# Patient Record
Sex: MALE | Race: WHITE | NOT HISPANIC OR LATINO | URBAN - METROPOLITAN AREA
[De-identification: names, ages, dates, MRNs, and addresses within clinical notes are randomized per-mention and may not be internally consistent; named-entity substitution may affect disease eponyms.]

---

## 2017-01-06 ENCOUNTER — OUTPATIENT (OUTPATIENT)
Dept: OUTPATIENT SERVICES | Facility: HOSPITAL | Age: 22
LOS: 1 days | End: 2017-01-06

## 2017-01-06 ENCOUNTER — APPOINTMENT (OUTPATIENT)
Dept: CV DIAGNOSITCS | Facility: HOSPITAL | Age: 22
End: 2017-01-06

## 2017-01-06 DIAGNOSIS — G71.11 MYOTONIC MUSCULAR DYSTROPHY: ICD-10-CM

## 2017-04-06 ENCOUNTER — APPOINTMENT (OUTPATIENT)
Dept: NEUROLOGY | Facility: CLINIC | Age: 22
End: 2017-04-06

## 2017-04-06 VITALS
BODY MASS INDEX: 22.88 KG/M2 | DIASTOLIC BLOOD PRESSURE: 67 MMHG | HEART RATE: 84 BPM | HEIGHT: 65.5 IN | WEIGHT: 139 LBS | SYSTOLIC BLOOD PRESSURE: 116 MMHG

## 2019-12-26 ENCOUNTER — TRANSCRIPTION ENCOUNTER (OUTPATIENT)
Age: 24
End: 2019-12-26

## 2019-12-29 ENCOUNTER — TRANSCRIPTION ENCOUNTER (OUTPATIENT)
Age: 24
End: 2019-12-29

## 2023-12-20 ENCOUNTER — APPOINTMENT (OUTPATIENT)
Dept: HEPATOLOGY | Facility: CLINIC | Age: 28
End: 2023-12-20

## 2024-02-15 ENCOUNTER — APPOINTMENT (OUTPATIENT)
Dept: HEPATOLOGY | Facility: CLINIC | Age: 29
End: 2024-02-15
Payer: COMMERCIAL

## 2024-02-15 VITALS
DIASTOLIC BLOOD PRESSURE: 76 MMHG | HEIGHT: 65.5 IN | HEART RATE: 76 BPM | BODY MASS INDEX: 22.06 KG/M2 | SYSTOLIC BLOOD PRESSURE: 116 MMHG | RESPIRATION RATE: 22 BRPM | OXYGEN SATURATION: 99 % | WEIGHT: 134 LBS | TEMPERATURE: 96.3 F

## 2024-02-15 DIAGNOSIS — F81.9 DEVELOPMENTAL DISORDER OF SCHOLASTIC SKILLS, UNSPECIFIED: ICD-10-CM

## 2024-02-15 DIAGNOSIS — G71.11 MYOTONIC MUSCULAR DYSTROPHY: ICD-10-CM

## 2024-02-15 PROCEDURE — 99204 OFFICE O/P NEW MOD 45 MIN: CPT

## 2024-02-15 RX ORDER — MULTIVITAMIN
TABLET ORAL
Refills: 0 | Status: ACTIVE | COMMUNITY

## 2024-02-19 LAB
A1AT SERPL-MCNC: 144 MG/DL
AFP-TM SERPL-MCNC: 8.7 NG/ML
ALBUMIN SERPL ELPH-MCNC: 4.3 G/DL
ALP BLD-CCNC: 97 U/L
ALT SERPL-CCNC: 36 U/L
ANA SER IF-ACNC: NEGATIVE
ANION GAP SERPL CALC-SCNC: 11 MMOL/L
AST SERPL-CCNC: 30 U/L
BASOPHILS # BLD AUTO: 0.04 K/UL
BASOPHILS NFR BLD AUTO: 0.7 %
BILIRUB DIRECT SERPL-MCNC: 0.1 MG/DL
BILIRUB SERPL-MCNC: 0.2 MG/DL
BUN SERPL-MCNC: 11 MG/DL
CALCIUM SERPL-MCNC: 9.4 MG/DL
CERULOPLASMIN SERPL-MCNC: 22 MG/DL
CHLORIDE SERPL-SCNC: 104 MMOL/L
CO2 SERPL-SCNC: 30 MMOL/L
CREAT SERPL-MCNC: 0.5 MG/DL
DEPRECATED KAPPA LC FREE/LAMBDA SER: 1.15 RATIO
EGFR: 142 ML/MIN/1.73M2
EOSINOPHIL # BLD AUTO: 0.11 K/UL
EOSINOPHIL NFR BLD AUTO: 1.9 %
ESTIMATED AVERAGE GLUCOSE: 100 MG/DL
FERRITIN SERPL-MCNC: 9 NG/ML
GGT SERPL-CCNC: 34 U/L
GLUCOSE SERPL-MCNC: 81 MG/DL
HBA1C MFR BLD HPLC: 5.1 %
HBV CORE IGG+IGM SER QL: NONREACTIVE
HBV SURFACE AB SERPL IA-ACNC: 16.7 MIU/ML
HBV SURFACE AG SER QL: NONREACTIVE
HCT VFR BLD CALC: 42 %
HCV AB SER QL: NONREACTIVE
HCV S/CO RATIO: 0.13 S/CO
HEPATITIS A IGG ANTIBODY: REACTIVE
HGB BLD-MCNC: 13.7 G/DL
HIV1+2 AB SPEC QL IA.RAPID: NONREACTIVE
IGA SER QL IEP: 142 MG/DL
IGG SER QL IEP: 881 MG/DL
IGM SER QL IEP: 117 MG/DL
IMM GRANULOCYTES NFR BLD AUTO: 0.3 %
INR PPP: 0.99 RATIO
IRON SATN MFR SERPL: 22 %
IRON SERPL-MCNC: 73 UG/DL
KAPPA LC CSF-MCNC: 1.57 MG/DL
KAPPA LC SERPL-MCNC: 1.81 MG/DL
LKM AB SER QL IF: <20.1 UNITS
LYMPHOCYTES # BLD AUTO: 1.5 K/UL
LYMPHOCYTES NFR BLD AUTO: 25.8 %
MAN DIFF?: NORMAL
MCHC RBC-ENTMCNC: 27.8 PG
MCHC RBC-ENTMCNC: 32.6 GM/DL
MCV RBC AUTO: 85.2 FL
MONOCYTES # BLD AUTO: 0.37 K/UL
MONOCYTES NFR BLD AUTO: 6.4 %
NEUTROPHILS # BLD AUTO: 3.78 K/UL
NEUTROPHILS NFR BLD AUTO: 64.9 %
PLATELET # BLD AUTO: 177 K/UL
POTASSIUM SERPL-SCNC: 4.2 MMOL/L
PROT SERPL-MCNC: 6.5 G/DL
PT BLD: 11.2 SEC
RBC # BLD: 4.93 M/UL
RBC # FLD: 13.6 %
SMOOTH MUSCLE AB SER QL IF: NORMAL
SODIUM SERPL-SCNC: 145 MMOL/L
TIBC SERPL-MCNC: 330 UG/DL
UIBC SERPL-MCNC: 258 UG/DL
WBC # FLD AUTO: 5.82 K/UL

## 2024-02-23 LAB — SOLUBLE LIVER IGG SER IA-ACNC: 2.3

## 2024-02-26 LAB — AFP-TM SERPL-MCNC: 8.9 NG/ML

## 2024-02-29 LAB
LYSOSOMAL ACID LIPASE INTERPRETATION: NORMAL
LYSOSOMAL ACID LIPASE: 38 PMOL/HR/UL

## 2024-03-01 NOTE — PHYSICAL EXAM
[Scleral Icterus] : No Scleral Icterus [Spider Angioma] : No spider angioma(s) were observed [Abdominal  Ascites] : no ascites [Splenomegaly] : no splenomegaly [Caput Medusae] : no caput medusae observed [Non-Tender] : non-tender [Asterixis] : no asterixis observed [Smooth] : smooth [Palmar Erythema] : no Palmar Erythema [Jaundice] : No jaundice [General Appearance - In No Acute Distress] : in no acute distress [General Appearance - Alert] : alert [General Appearance - Well Nourished] : well nourished [General Appearance - Well-Appearing] : healthy appearing [Hearing Threshold Finger Rub Not [place]] : hearing was normal [Sclera] : the sclera and conjunctiva were normal [Oropharynx] : the oropharynx was normal [Neck Appearance] : the appearance of the neck was normal [Jugular Venous Distention Increased] : there was no jugular-venous distention [Neck Cervical Mass (___cm)] : no neck mass was observed [Exaggerated Use Of Accessory Muscles For Inspiration] : no accessory muscle use [Respiration, Rhythm And Depth] : normal respiratory rhythm and effort [Heart Sounds] : normal S1 and S2 [Heart Rate And Rhythm] : heart rate was normal and rhythm regular [Auscultation Breath Sounds / Voice Sounds] : lungs were clear to auscultation bilaterally [Murmurs] : no murmurs [Edema] : there was no peripheral edema [Abdomen Soft] : soft [Abdomen Tenderness] : non-tender [Bowel Sounds] : normal bowel sounds [Abdomen Mass (___ Cm)] : no abdominal mass palpated [Involuntary Movements] : no involuntary movements were seen [Abdomen Hernia] : no hernia was discovered [Nail Clubbing] : no clubbing  or cyanosis of the fingernails [FreeTextEntry1] : +ambulatory without any assistive device [Skin Color & Pigmentation] : normal skin color and pigmentation [Oriented To Time, Place, And Person] : oriented to person, place, and time [Skin Turgor] : normal skin turgor [] : no rash [Impaired Insight] : insight and judgment were intact [Affect] : the affect was normal [Memory Recent] : recent memory was not impaired [Memory Remote] : remote memory was not impaired [Mood] : the mood was normal

## 2024-03-01 NOTE — HISTORY OF PRESENT ILLNESS
[de-identified] : Mr. 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   PCP: Dr. Soham Figueroa  He was first noted to have an elevated ALT of 52 U/L on annual routine labs in 5/2023 and his PCP Dr. Figueroa recommended he see a hepatologist. He came today for consultation accompanied by his parents. His brother, Branden, is also my patient.  His weight has been overall stable for many years. He tries to eat a well-balanced diet at home. He does PT 3 times/week.  No prior liver biopsy or elastography and no prior hepatic or abdominal imaging.  He lives with his parents and brother. Never smoker. No alcohol consumption. No recreational drug use.  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 No known family history of cirrhosis or liver cancer.
Chest Pain

## 2024-03-01 NOTE — ASSESSMENT
[FreeTextEntry1] : # Elevated ALT: - A single mild and isolated ALT elevation can be very non-specific, but this may certainly also represent chronic liver disease such as related to steatosis / steatohepatitis as can be seen with myotonic dystrophy type 1. - Labs ordered today for re-evaluation as well as to assess for other possible chronic liver diseases. - Abdominal US ordered today. - FibroScan ordered today for non-invasive staging of fibrosis. - While his chronic liver disease may be related to his myotonic dystrophy type 1, we did discuss maintaining a healthy weight, eating a balanced Mediterranean style diet, and exercising regularly. We will discuss potential pharmacological therapy at his next visit if indicated based on his testing results.  # Health maintenance: - Will check HAV and HBV serologies with labs today and offer vaccinations if non-immune. - Mr. BARNETT was counseled to: abstain from alcohol; avoid use of herbal and dietary supplements due to potential hepatotoxicity; and limit use of acetaminophen to <2 grams per day.  Next follow-up: 3 months

## 2024-03-26 ENCOUNTER — NON-APPOINTMENT (OUTPATIENT)
Age: 29
End: 2024-03-26

## 2024-04-05 ENCOUNTER — NON-APPOINTMENT (OUTPATIENT)
Age: 29
End: 2024-04-05

## 2024-05-29 ENCOUNTER — APPOINTMENT (OUTPATIENT)
Dept: HEPATOLOGY | Facility: CLINIC | Age: 29
End: 2024-05-29
Payer: COMMERCIAL

## 2024-05-29 VITALS
HEART RATE: 66 BPM | DIASTOLIC BLOOD PRESSURE: 67 MMHG | WEIGHT: 134 LBS | OXYGEN SATURATION: 98 % | HEIGHT: 65.5 IN | TEMPERATURE: 97.4 F | SYSTOLIC BLOOD PRESSURE: 102 MMHG | BODY MASS INDEX: 22.06 KG/M2

## 2024-05-29 DIAGNOSIS — R77.2 ABNORMALITY OF ALPHAFETOPROTEIN: ICD-10-CM

## 2024-05-29 DIAGNOSIS — R74.8 ABNORMAL LEVELS OF OTHER SERUM ENZYMES: ICD-10-CM

## 2024-05-29 DIAGNOSIS — K76.0 FATTY (CHANGE OF) LIVER, NOT ELSEWHERE CLASSIFIED: ICD-10-CM

## 2024-05-29 PROCEDURE — 76981 USE PARENCHYMA: CPT

## 2024-05-29 PROCEDURE — 99214 OFFICE O/P EST MOD 30 MIN: CPT

## 2024-05-29 NOTE — REASON FOR VISIT
[Follow-Up: _____] : a [unfilled] follow-up visit [Initial Evaluation] : an initial evaluation [Other: _____] : [unfilled]

## 2024-05-30 NOTE — ASSESSMENT
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 Laboratory work-up for other causes of chronic liver disease was negative apart from borderline low lysosomal acid lipase (BARBER) level but with subsequent LIPA gene mutation analysis negative (3/2024), indicating no clinically significant genetic BARBER deficiency. - Recent abdominal ultrasound (2/21/24) consistent with mild steatosis and with non-cirrhotic liver morphology. - Non-invasive staging with FibroScan today (5/29/24) with normal median liver stiffness suggesting F0-1 fibrosis, also with normal CAP score though I do not feel that this is reliable enough to exclude presumed steatosis. - While his chronic liver disease may be related to his myotonic dystrophy type 1, we did discuss maintaining a healthy weight, eating a balanced Mediterranean style diet, and exercising regularly. - No current indication for targeted pharmacologic therapy such as resmetirom based on his FibroScan results. - We will plan for surveillance with repeat FibroScan in 2 years and he should also continue to have labs monitored at least annually by his PCP Dr. Figueroa.  # Elevated AFP: - He had mildly elevated AFP 8.7 (2/15/24) and 8.9 (2/24/24). We will obtain AFP-L3 and DCP today. - There was no suspicious hepatic lesion on recent ultrasound (2/21/24) or CT abdomen/pelvis with contrast (3/19/24) though, based on the CT report, it is not clear if it was a multiphase study. If AFP-L3 or DCP are elevated on today's labs, he will need re-imaging with multiphase CT abdomen. He and his father previously declined MRI abdomen as they felt he would have difficulty completing the study. - Given the mildly elevated AFP, we will plan for repeat abdominal ultrasound in 6 months (9/2024) if AFP-L3 and DCP are both within normal limits. - The mild elevation may also reflect ongoing hepatic inflammation/regeneration.  # Health maintenance: - He is HAV and HBV immune. - Mr. BARNETT was counseled to: abstain from alcohol; avoid use of herbal and dietary supplements due to potential hepatotoxicity; and limit use of acetaminophen to <2 grams per day.  Next follow-up: 2 years with same-day FibroScan, or earlier if any concerning tumor markers or imaging

## 2024-05-30 NOTE — PHYSICAL EXAM
[Non-Tender] : non-tender [Smooth] : smooth [General Appearance - Alert] : alert [General Appearance - In No Acute Distress] : in no acute distress [General Appearance - Well Nourished] : well nourished [General Appearance - Well-Appearing] : healthy appearing [Sclera] : the sclera and conjunctiva were normal [Hearing Threshold Finger Rub Not [place]] : hearing was normal [Oropharynx] : the oropharynx was normal [Neck Appearance] : the appearance of the neck was normal [Neck Cervical Mass (___cm)] : no neck mass was observed [Jugular Venous Distention Increased] : there was no jugular-venous distention [Respiration, Rhythm And Depth] : normal respiratory rhythm and effort [Exaggerated Use Of Accessory Muscles For Inspiration] : no accessory muscle use [Auscultation Breath Sounds / Voice Sounds] : lungs were clear to auscultation bilaterally [Heart Rate And Rhythm] : heart rate was normal and rhythm regular [Heart Sounds] : normal S1 and S2 [Murmurs] : no murmurs [Edema] : there was no peripheral edema [Bowel Sounds] : normal bowel sounds [Abdomen Soft] : soft [Abdomen Tenderness] : non-tender [Abdomen Mass (___ Cm)] : no abdominal mass palpated [Abdomen Hernia] : no hernia was discovered [Nail Clubbing] : no clubbing  or cyanosis of the fingernails [Involuntary Movements] : no involuntary movements were seen [Skin Color & Pigmentation] : normal skin color and pigmentation [Skin Turgor] : normal skin turgor [] : no rash [Oriented To Time, Place, And Person] : oriented to person, place, and time [Impaired Insight] : insight and judgment were intact [Affect] : the affect was normal [Mood] : the mood was normal [Memory Recent] : recent memory was not impaired [Memory Remote] : remote memory was not impaired [Scleral Icterus] : No Scleral Icterus [Spider Angioma] : No spider angioma(s) were observed [Abdominal  Ascites] : no ascites [Splenomegaly] : no splenomegaly [Caput Medusae] : no caput medusae observed [Asterixis] : no asterixis observed [Jaundice] : No jaundice [Palmar Erythema] : no Palmar Erythema [FreeTextEntry1] : +ambulatory without any assistive device

## 2024-05-30 NOTE — HISTORY OF PRESENT ILLNESS
[de-identified] : Mr. Barnett is a very pleasant 28 yo M with myotonic dystrophy type 1 (MD1) who is being seen for follow-up of steatotic liver disease related to MD1, also with elevated AFP.   PCP: Dr. Soham Figueroa  Abdominal US (2/21/24): Normal liver size with mild heterogeneity of the echotexture due to fatty infiltration of hepatocellular disease. No focal hepatic lesion. Normal size spleen.  CT abdomen with and without contrast (3/19/24): Unremarkable.  FibroScan (5/29/24): Median liver stiffness 2.9 kPa (normal, consistent with F0-1 fibrosis) and CAP score 193 dB/m (normal, consistent with S0 steatosis).  He was last seen by me on 2/15/24 and is here today for routine follow-up and initial FibroScan (above). After his last visit, he underwent hepatic imaging as above due to elevated AFP with no focal hepatic lesion seen. Due to borderline low BARBER level on prior labs (2/2024), he also had genetic testing through [facility] in mid-3/2024 that did not show any LIPA gene mutation (i.e., no clinically significant BARBER deficiency). Today, he reports feeling well overall with stable weight. He tries to eat a well-balanced diet at home and does PT 3 times/week.  He lives with his parents and brother. Never smoker. No alcohol consumption. No recreational drug use.  He has a family history of myotonic dystrophy type 1 (mother and brother) and elevated liver enzymes likely due to MD-1 associated SLD (mother and brother). No known family history of cirrhosis or liver cancer.

## 2024-05-30 NOTE — HISTORY OF PRESENT ILLNESS
[de-identified] : Mr. Barnett is a very pleasant 28 yo M with myotonic dystrophy type 1 (MD1) who is being seen for follow-up of steatotic liver disease related to MD1, also with elevated AFP.   PCP: Dr. Soham Figueroa  Abdominal US (2/21/24): Normal liver size with mild heterogeneity of the echotexture due to fatty infiltration of hepatocellular disease. No focal hepatic lesion. Normal size spleen.  CT abdomen with and without contrast (3/19/24): Unremarkable.  FibroScan (5/29/24): Median liver stiffness 2.9 kPa (normal, consistent with F0-1 fibrosis) and CAP score 193 dB/m (normal, consistent with S0 steatosis).  He was last seen by me on 2/15/24 and is here today for routine follow-up and initial FibroScan (above). After his last visit, he underwent hepatic imaging as above due to elevated AFP with no focal hepatic lesion seen. Due to borderline low BARBER level on prior labs (2/2024), he also had genetic testing through [facility] in mid-3/2024 that did not show any LIPA gene mutation (i.e., no clinically significant BARBER deficiency). Today, he reports feeling well overall with stable weight. He tries to eat a well-balanced diet at home and does PT 3 times/week.  He lives with his parents and brother. Never smoker. No alcohol consumption. No recreational drug use.  He has a family history of myotonic dystrophy type 1 (mother and brother) and elevated liver enzymes likely due to MD-1 associated SLD (mother and brother). No known family history of cirrhosis or liver cancer.

## 2024-05-30 NOTE — PHYSICAL EXAM
[Non-Tender] : non-tender [Smooth] : smooth [General Appearance - Alert] : alert [General Appearance - In No Acute Distress] : in no acute distress [General Appearance - Well Nourished] : well nourished [General Appearance - Well-Appearing] : healthy appearing [Sclera] : the sclera and conjunctiva were normal [Hearing Threshold Finger Rub Not [place]] : hearing was normal [Oropharynx] : the oropharynx was normal [Neck Appearance] : the appearance of the neck was normal [Neck Cervical Mass (___cm)] : no neck mass was observed [Jugular Venous Distention Increased] : there was no jugular-venous distention [Respiration, Rhythm And Depth] : normal respiratory rhythm and effort [Exaggerated Use Of Accessory Muscles For Inspiration] : no accessory muscle use [Auscultation Breath Sounds / Voice Sounds] : lungs were clear to auscultation bilaterally [Heart Rate And Rhythm] : heart rate was normal and rhythm regular [Heart Sounds] : normal S1 and S2 [Murmurs] : no murmurs [Edema] : there was no peripheral edema [Bowel Sounds] : normal bowel sounds [Abdomen Soft] : soft [Abdomen Tenderness] : non-tender [Abdomen Mass (___ Cm)] : no abdominal mass palpated [Abdomen Hernia] : no hernia was discovered [Nail Clubbing] : no clubbing  or cyanosis of the fingernails [Involuntary Movements] : no involuntary movements were seen [Skin Color & Pigmentation] : normal skin color and pigmentation [Skin Turgor] : normal skin turgor [] : no rash [Oriented To Time, Place, And Person] : oriented to person, place, and time [Impaired Insight] : insight and judgment were intact [Affect] : the affect was normal [Mood] : the mood was normal [Memory Recent] : recent memory was not impaired [Memory Remote] : remote memory was not impaired [Scleral Icterus] : No Scleral Icterus [Spider Angioma] : No spider angioma(s) were observed [Abdominal  Ascites] : no ascites [Splenomegaly] : no splenomegaly [Caput Medusae] : no caput medusae observed [Asterixis] : no asterixis observed [Jaundice] : No jaundice [Palmar Erythema] : no Palmar Erythema [FreeTextEntry1] : +ambulatory without any assistive device

## 2024-05-31 LAB
ALPHA-1-FETOPROTEIN-L3: NORMAL %
ALPHA-1-FETOPROTEIN: 4.4 NG/ML

## 2024-06-04 LAB — ACARBOXYPROTHROMBIN SERPL-MCNC: 0.2 NG/ML

## 2024-06-07 PROBLEM — R74.8 ELEVATED LIVER ENZYMES: Status: ACTIVE | Noted: 2024-02-15

## 2024-06-07 PROBLEM — K76.0 NAFLD (NONALCOHOLIC FATTY LIVER DISEASE): Status: ACTIVE | Noted: 2024-02-15

## 2025-07-05 ENCOUNTER — NON-APPOINTMENT (OUTPATIENT)
Age: 30
End: 2025-07-05